# Patient Record
Sex: FEMALE | Race: WHITE | NOT HISPANIC OR LATINO | Employment: OTHER | URBAN - METROPOLITAN AREA
[De-identification: names, ages, dates, MRNs, and addresses within clinical notes are randomized per-mention and may not be internally consistent; named-entity substitution may affect disease eponyms.]

---

## 2021-08-16 ENCOUNTER — TREATMENT (OUTPATIENT)
Dept: PHYSICAL THERAPY | Facility: CLINIC | Age: 66
End: 2021-08-16

## 2021-08-16 DIAGNOSIS — M75.51 SUBACROMIAL BURSITIS OF RIGHT SHOULDER JOINT: Primary | ICD-10-CM

## 2021-08-16 DIAGNOSIS — R68.89 DECREASED STRENGTH, ENDURANCE, AND MOBILITY: ICD-10-CM

## 2021-08-16 DIAGNOSIS — M25.511 CHRONIC RIGHT SHOULDER PAIN: ICD-10-CM

## 2021-08-16 DIAGNOSIS — R29.898 WEAKNESS OF SHOULDER: ICD-10-CM

## 2021-08-16 DIAGNOSIS — R53.1 DECREASED STRENGTH, ENDURANCE, AND MOBILITY: ICD-10-CM

## 2021-08-16 DIAGNOSIS — Z74.09 DECREASED STRENGTH, ENDURANCE, AND MOBILITY: ICD-10-CM

## 2021-08-16 DIAGNOSIS — G89.29 CHRONIC RIGHT SHOULDER PAIN: ICD-10-CM

## 2021-08-16 PROCEDURE — 97161 PT EVAL LOW COMPLEX 20 MIN: CPT | Performed by: PHYSICAL THERAPIST

## 2021-08-16 PROCEDURE — 97140 MANUAL THERAPY 1/> REGIONS: CPT | Performed by: PHYSICAL THERAPIST

## 2021-08-16 PROCEDURE — 97110 THERAPEUTIC EXERCISES: CPT | Performed by: PHYSICAL THERAPIST

## 2021-08-16 NOTE — PROGRESS NOTES
Physical Therapy Initial Evaluation and Plan of Care    Patient: Mikaela Macias   : 1955  Diagnosis/ICD-10 Code:  Subacromial bursitis of right shoulder joint [M75.51]  Referring practitioner: Finn Araya MD  Date of Initial Visit: 2021  Today's Date: 2021  Patient seen for 1 sessions           Subjective Questionnaire:UEFI: 17% limitation      Subjective Evaluation    History of Present Illness  Date of onset: 2021  Mechanism of injury: Patient states she developed an insidious onset of right lateral shoulder pain.  She initially thought that she injured her shoulder during a workout over some weight training, bodyweight exercises.  Patient does not remember a specific injury during any workouts and after taking a rest from workouts did not notice any improvement.  AGG: donning and doffing a bra or shirt causes increase pain 9/10//ease quickly  Margaret for several hours increase pain 5/10, worsened to 9/10 later in evening//ease over more several days  Ease: avoidance, ignore it, Aleve occasionally helps  AM: depends on previous day 2-9/10  Through day: Subsides until aggravated again  PM: Worse  Night pain (+) waking after approx 1 hour but then takes up to 2 hours to get back to sleep.  Denies neck issues but occ has pain over upper trapezius on right.    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   Precautions and Work Restrictions: walk 4 days per week, weight trainig 3 x per week, gardening, mow grass, domestic engineering, farmingQuality of life: excellent    Pain  Current pain ratin  At best pain ratin  At worst pain rating: 10  Location: Feels bruised over the lateral upper arm  Relieving factors: medications and change in position  Progression: no change    Social Support  Lives in: multiple-level home  Lives with: spouse    Hand dominance:  right    Diagnostic Tests  X-ray: normal (read as normal, right shoulder)    Treatments  Current treatment: medication  Patient Goals  Patient goals for therapy: increased strength, return to sport/leisure activities, increased motion and independence with ADLs/IADLs  Patient goal: Able to reach behind back, dress and sleep without pain           Objective          Postural Observations  Seated posture: fair  Standing posture: fair    Additional Postural Observation Details  Rounded shoulders, mild forward head, upper thoracic kyphosis noted in sitting and standing with a flattened T-spine    Observations   Left Shoulder   Negative for atrophy, deformity, edema and incision.     Right Shoulder  Negative for atrophy, deformity and edema.       Palpation   Left   No palpable tenderness to the infraspinatus.   Tenderness of the infraspinatus.     Right Tenderness of the infraspinatus, supraspinatus and upper trapezius.     Tenderness     Left Shoulder   No tenderness No tenderness in the AC joint, acromion, biceps tendon (proximal), bicipital groove, clavicle, coracoid process, infraspinatus tendon, SC joint, subacromial bursa, subscapularis tendon and supraspinatus tendon.     Right Shoulder  Tenderness in the subacromial bursa and supraspinatus tendon. No tenderness in the AC joint, acromion, biceps tendon (proximal), bicipital groove, clavicle, coracoid process, infraspinatus tendon and SC joint.     Active Range of Motion   Cervical/Thoracic Spine   Cervical    Flexion: WFL  Extension: WFL  Left lateral flexion: 25 degrees with pain  Right lateral flexion: WFL  Left rotation: 75 degrees with pain  Right rotation: WFL    Thoracic   Flexion: WFL  Extension: Active thoracic extension: but stiff. WFL  Left Shoulder   Normal active range of motion  External rotation BTH: T3   Internal rotation BTB: L3     Right Shoulder   Flexion: 165 degrees   Extension: 40 degrees   Abduction: 130 degrees   External rotation 0°: 60  degrees   External rotation BTH: T3   Internal rotation BTB: L1     Passive Range of Motion   Left Shoulder   Normal passive range of motion    Right Shoulder   Abduction: with pain  External rotation 0°: WFL  External rotation 90°: WFL  Internal rotation 90°: WFL    Additional Passive Range of Motion Details  Stiffness noted into extension and right quadrant but produced upper trap pain not shoulder symptoms.    Scapular Mobility   Left Shoulder   Scapular mobility: WFL    Right Shoulder   Scapular mobility: good    Joint Play     Right Shoulder  Hypomobile in the posterior capsule and inferior capsule.     Strength/Myotome Testing     Left Shoulder   Normal muscle strength    Right Shoulder     Planes of Motion   Flexion: 4   Extension: 5   Right shoulder abduction strength: weak and painful.   Adduction: 5   External rotation at 0°: 4   Internal rotation at 0°: 4     Isolated Muscles   Biceps: 5   Lower trapezius: 4-   Middle trapezius: 4-   Serratus anterior: 4-   Triceps: 5   Upper trapezius: 5     Tests   Cervical     Left   Negative active compression ([place]).     Right   Negative active compression ([place]).     Left Shoulder   Negative AC shear, anterior slide, Hawkin's, Neer's, posterior load and shift and sulcus sign.     Right Shoulder   Positive AC shear, Hawkin's and Neer's.   Negative anterior slide, posterior load and shift and sulcus sign.           Assessment & Plan     Assessment  Impairments: abnormal muscle firing, abnormal muscle tone, abnormal or restricted ROM, activity intolerance, impaired physical strength, lacks appropriate home exercise program and pain with function  Assessment details: 66 y/o female with signs and symptoms consistent with sub acromial impingement. Patient has limitation ins shoulder ROM and strength, poor posture and pain with ADL's.  Patient will benefit from therapy to address the above mentioned impairments.    Barriers to therapy: none  Prognosis:  good  Prognosis details: Patient responded well to therapy to day and has a stable condition that is amendable to physical therapy  Functional Limitations: carrying objects, lifting, pulling, pushing, reaching behind back, reaching overhead and unable to perform repetitive tasks  Goals  Plan Goals: SHOULDER  PROBLEMS:  1. The patient has limited ROM of the right shoulder.    LTG 1: 6 weeks:  The patient will demonstrate painfree AROM 160 degrees of L shoulder flexion, 160 degrees of shoulder abduction, 70 degrees of shoulder external rotation, and 80 degrees of shoulder internal rotation to allow the patient to reach into upper kitchen cabinets and manipulate clothing behind the back with greater ease.    STATUS:  New   STG 1a: 3 weeks:  The patient will demonstrate painfree AROM 140 degrees of L shoulder flexion, 140 degrees of shoulder abduction, 60 degrees of shoulder external rotation, and 45 degrees of shoulder internal rotation.    STATUS:  New   TREATMENT: Manual therapy, therapeutic exercise, home exercise instruction, and modalities as needed to include: electrical stimulation, ultrasound, moist heat, and ice.    2. The patient has limited strength of the right shoulder.   LTG 2: 6 weeks:  The patient will demonstrate 5/5 strength for L shoulder flexion, abduction, external rotation, and internal rotation in order to demonstrate improved shoulder stability.    STATUS:  New   STG 2a: 4 weeks:  The patient will demonstrate 4+/5 strength for L shoulder flexion, abduction, external rotation, and internal rotation.    STATUS:  New   STG2b:  2 weeks:  The patient will be independent with home exercises.     STATUS:  New   TREATMENT: Manual therapy, therapeutic exercise, home exercise instruction, and modalities as needed to include: electrical stimulation, ultrasound, moist heat, and ice.     3. The patient complains of pain to the right shoulder.   LTG 3: 4 weeks:  The patient will report a pain rating of 2/10  or better in order to improve sleep quality and tolerance to performance of activities of daily living.    STATUS:  New   STG 3a: 2 weeks:  The patient will report a pain rating of 4/10 or better.     STATUS:  New   TREATMENT: Manual therapy, therapeutic exercise, home exercise instruction, and modalities as needed to include: electrical stimulation, ultrasound, moist heat, and ice.    4.. Carrying, Moving, and Handling Objects Functional Limitation     LTG 4: 4 weeks:  The patient will demonstrate 6% limitation by achieving a score of 5 on the LEFS.    STATUS:  New   STG 4a: 2 weeks:  The patient will demonstrate independence with comprehensive HEP.       STATUS:  New   TREATMENT:  Manual therapy, therapeutic exercise, home exercise instruction, and modalities as needed to include: moist heat, electrical stimulation, and ultrasound.    Plan  Therapy options: will be seen for skilled physical therapy services  Planned modality interventions: TENS  Planned therapy interventions: manual therapy, neuromuscular re-education, motor coordination training, postural training, soft tissue mobilization, spinal/joint mobilization, strengthening, stretching, therapeutic activities, joint mobilization, IADL retraining, home exercise program, gait training, functional ROM exercises and flexibility  Frequency: 2x week  Duration in weeks: 8  Plan details: Plan to focus on addressing her ROM deficits with stretching and manual therapy and progress to strengthening her R/C and periscapular musculature and address postural deficits.        Timed:         Manual Therapy:    8     mins  68436;     Therapeutic Exercise:    10     mins  33459;     Neuromuscular Tal:        mins  92982;    Therapeutic Activity:          mins  19625;     Gait Training:           mins  34285;     Ultrasound:          mins  89703;    Ionto                                   mins   95884  Self-care  ____ mins 30669    Un-Timed:  Electrical Stimulation:          mins  61922 ( );  Dry Needling          mins self-pay  Traction          mins 97283  Low Eval     42     Mins  97401  Mod Eval          Mins  65350  High Eval                            Mins  86096  Canal repositioning _____ mins  09195        Timed Treatment:   18   mins   Total Treatment:     60   mins    PT SIGNATURE: Marcello Eshaniki, PT   DATE TREATMENT INITIATED: 8/16/2021    Initial Certification  Certification Period: 11/14/2021  I certify that the therapy services are furnished while this patient is under my care.  The services outlined above are required by this patient, and will be reviewed every 90 days.     PHYSICIAN: Finn Araya MD      DATE:     Please sign and return via fax to [phone].. Thank you, [facility].

## 2021-08-20 ENCOUNTER — TREATMENT (OUTPATIENT)
Dept: PHYSICAL THERAPY | Facility: CLINIC | Age: 66
End: 2021-08-20

## 2021-08-20 DIAGNOSIS — M25.511 CHRONIC RIGHT SHOULDER PAIN: ICD-10-CM

## 2021-08-20 DIAGNOSIS — R53.1 DECREASED STRENGTH, ENDURANCE, AND MOBILITY: ICD-10-CM

## 2021-08-20 DIAGNOSIS — M75.51 SUBACROMIAL BURSITIS OF RIGHT SHOULDER JOINT: Primary | ICD-10-CM

## 2021-08-20 DIAGNOSIS — R68.89 DECREASED STRENGTH, ENDURANCE, AND MOBILITY: ICD-10-CM

## 2021-08-20 DIAGNOSIS — R29.898 WEAKNESS OF SHOULDER: ICD-10-CM

## 2021-08-20 DIAGNOSIS — G89.29 CHRONIC RIGHT SHOULDER PAIN: ICD-10-CM

## 2021-08-20 DIAGNOSIS — Z74.09 DECREASED STRENGTH, ENDURANCE, AND MOBILITY: ICD-10-CM

## 2021-08-20 PROCEDURE — 97140 MANUAL THERAPY 1/> REGIONS: CPT | Performed by: PHYSICAL THERAPIST

## 2021-08-20 PROCEDURE — 97110 THERAPEUTIC EXERCISES: CPT | Performed by: PHYSICAL THERAPIST

## 2021-08-20 NOTE — PROGRESS NOTES
Physical Therapy Daily Progress Note        Patient: Mikaela Macias   : 1955  Diagnosis/ICD-10 Code:  Subacromial bursitis of right shoulder joint [M75.51]  Referring practitioner: Finn Araya MD  Date of Initial Visit: Type: THERAPY  Noted: 2021  Today's Date: 2021  Patient seen for 2 sessions             Subjective   Mikaela Macias reports: shoulder is feeling 25 to 30% better, she is not through day and is sleeping better.  Patient had a little pain 2 days ago with vacuuming but is much better today.  HEP is going well    Objective   Patient was able to flex her shoulder through full range with mild End range pain, HBB increase pain 3-4/10 the resolved quickly.  Post treatment she had noted a reduction in her pain and had improvement in her ROM  See Exercise, Manual, and Modality Logs for complete treatment.       Assessment/Plan  Plan to progress her shoulder stab exercises and progress her to functional activities with overhead lifting  Progress per Plan of Care and Progress strengthening /stabilization /functional activity           Timed:  Manual Therapy:    10     mins  40531;  Therapeutic Exercise:    20     mins  77897;     Neuromuscular Tal:        mins  31437;    Therapeutic Activity:          mins  29623;     Gait Training:           mins  83783;     Ultrasound:          mins  95517;    Electrical Stimulation:         mins  27434;  Iontophoresis          mins  25178    Untimed:  Electrical Stimulation:         mins  34141 ( );  Mechanical Traction:         mins  85366;   Fluidotherapy          mins  43855    Timed Treatment:   30   mins   Total Treatment:     30   mins        Marcello Muñoz PT  Physical Therapist

## 2021-08-25 ENCOUNTER — TREATMENT (OUTPATIENT)
Dept: PHYSICAL THERAPY | Facility: CLINIC | Age: 66
End: 2021-08-25

## 2021-08-25 DIAGNOSIS — Z74.09 DECREASED STRENGTH, ENDURANCE, AND MOBILITY: ICD-10-CM

## 2021-08-25 DIAGNOSIS — M75.51 SUBACROMIAL BURSITIS OF RIGHT SHOULDER JOINT: Primary | ICD-10-CM

## 2021-08-25 DIAGNOSIS — M25.511 CHRONIC RIGHT SHOULDER PAIN: ICD-10-CM

## 2021-08-25 DIAGNOSIS — G89.29 CHRONIC RIGHT SHOULDER PAIN: ICD-10-CM

## 2021-08-25 DIAGNOSIS — R53.1 DECREASED STRENGTH, ENDURANCE, AND MOBILITY: ICD-10-CM

## 2021-08-25 DIAGNOSIS — R68.89 DECREASED STRENGTH, ENDURANCE, AND MOBILITY: ICD-10-CM

## 2021-08-25 DIAGNOSIS — R29.898 WEAKNESS OF SHOULDER: ICD-10-CM

## 2021-08-25 PROCEDURE — 97110 THERAPEUTIC EXERCISES: CPT | Performed by: PHYSICAL THERAPIST

## 2021-08-25 PROCEDURE — 97140 MANUAL THERAPY 1/> REGIONS: CPT | Performed by: PHYSICAL THERAPIST

## 2021-08-25 NOTE — PROGRESS NOTES
Physical Therapy Daily Progress Note      Patient: Mikaela Macias   : 1955  Diagnosis/ICD-10 Code:  Subacromial bursitis of right shoulder joint [M75.51]  Referring practitioner: Finn Araya MD  Date of Initial Visit: Type: THERAPY  Noted: 2021  Today's Date: 2021  Patient seen for 3 sessions           Subjective   Reports she was picking apples and her shoulder flared up the next day.  Today, has 3/10 pain.   Objective   See Exercise, Manual, and Modality Logs for complete treatment.   Comparable sign: AP GHJ glide with shoulder extension  After manual techniques: abolished comparable signs      Assessment/Plan  Responded well to A/P  Glides.     PLAN: Progress per Plan of Care             Manual Therapy:    15     mins  24208;  Therapeutic Exercise:    15     mins  71924;     Neuromuscular Tal:        mins  45922;    Therapeutic Activity:          mins  90139;     Gait Training:           mins  20111;     Ultrasound:          mins  07793;    Electrical Stimulation:         mins  97171 ( );    Timed Treatment:   30   mins   Total Treatment:     30   mins    Paula Muñoz PT  Physical Therapist

## 2021-09-02 ENCOUNTER — TREATMENT (OUTPATIENT)
Dept: PHYSICAL THERAPY | Facility: CLINIC | Age: 66
End: 2021-09-02

## 2021-09-02 DIAGNOSIS — R68.89 DECREASED STRENGTH, ENDURANCE, AND MOBILITY: ICD-10-CM

## 2021-09-02 DIAGNOSIS — Z74.09 DECREASED STRENGTH, ENDURANCE, AND MOBILITY: ICD-10-CM

## 2021-09-02 DIAGNOSIS — R29.898 WEAKNESS OF SHOULDER: ICD-10-CM

## 2021-09-02 DIAGNOSIS — M75.51 SUBACROMIAL BURSITIS OF RIGHT SHOULDER JOINT: Primary | ICD-10-CM

## 2021-09-02 DIAGNOSIS — M25.511 CHRONIC RIGHT SHOULDER PAIN: ICD-10-CM

## 2021-09-02 DIAGNOSIS — G89.29 CHRONIC RIGHT SHOULDER PAIN: ICD-10-CM

## 2021-09-02 DIAGNOSIS — R53.1 DECREASED STRENGTH, ENDURANCE, AND MOBILITY: ICD-10-CM

## 2021-09-02 PROCEDURE — 97140 MANUAL THERAPY 1/> REGIONS: CPT | Performed by: PHYSICAL THERAPIST

## 2021-09-02 PROCEDURE — 97110 THERAPEUTIC EXERCISES: CPT | Performed by: PHYSICAL THERAPIST

## 2021-09-08 ENCOUNTER — TREATMENT (OUTPATIENT)
Dept: PHYSICAL THERAPY | Facility: CLINIC | Age: 66
End: 2021-09-08

## 2021-09-08 DIAGNOSIS — G89.29 CHRONIC RIGHT SHOULDER PAIN: ICD-10-CM

## 2021-09-08 DIAGNOSIS — R29.898 WEAKNESS OF SHOULDER: ICD-10-CM

## 2021-09-08 DIAGNOSIS — M75.51 SUBACROMIAL BURSITIS OF RIGHT SHOULDER JOINT: Primary | ICD-10-CM

## 2021-09-08 DIAGNOSIS — R68.89 DECREASED STRENGTH, ENDURANCE, AND MOBILITY: ICD-10-CM

## 2021-09-08 DIAGNOSIS — Z74.09 DECREASED STRENGTH, ENDURANCE, AND MOBILITY: ICD-10-CM

## 2021-09-08 DIAGNOSIS — M25.511 CHRONIC RIGHT SHOULDER PAIN: ICD-10-CM

## 2021-09-08 DIAGNOSIS — R53.1 DECREASED STRENGTH, ENDURANCE, AND MOBILITY: ICD-10-CM

## 2021-09-08 PROCEDURE — 97140 MANUAL THERAPY 1/> REGIONS: CPT | Performed by: PHYSICAL THERAPIST

## 2021-09-08 PROCEDURE — 97110 THERAPEUTIC EXERCISES: CPT | Performed by: PHYSICAL THERAPIST

## 2021-09-08 NOTE — PROGRESS NOTES
"   Physical Therapy Daily Progress Note      Patient: Mikaela Macias   : 1955  Diagnosis/ICD-10 Code:  Subacromial bursitis of right shoulder joint [M75.51]  Referring practitioner: Finn Araya MD  Date of Initial Visit: Type: THERAPY  Noted: 2021  Today's Date: 2021  Patient seen for 5 sessions           Subjective Reports she is feeling better and has 1-3/10 pain in R shoulder with IR but on consistently.  States it \"catches\" her when she is doing active motion such as gardening.  Pt states it's when she is abducting with IR that she will get a pain in her shoulder.     Objective   See Exercise, Manual, and Modality Logs for complete treatment.   (+) clowards - medial border R scapula C6, 7  -Cervical Comparable signs * - R cervical rotation  And cervical extension  Each Produce medial border scap pain  Cervical/thoracic joint mobilizations - MWM, and thoracic ext mob grade 5 abolish comparable signs.     -Comparable sign in R shoulder: IR with slight abduction  Manual techniques in L SL, P/A to glenohumeral head, caudal GHJ, IR with anterior glide  Decreased comparable sign from 3/10 to < 1/10  Assessment/Plan Pt tolerating treatment well.       PLAN: Progress per Plan of Care             Manual Therapy:    20     mins  87960;  Therapeutic Exercise:    12     mins  99334;     Neuromuscular Tal:        mins  69271;    Therapeutic Activity:          mins  12075;     Gait Training:           mins  97691;     Ultrasound:          mins  53311;    Electrical Stimulation:         mins  90097 ( );    Timed Treatment:   32   mins   Total Treatment:     32   mins    Paula Muñoz PT  Physical Therapist                    "

## 2021-09-10 ENCOUNTER — TREATMENT (OUTPATIENT)
Dept: PHYSICAL THERAPY | Facility: CLINIC | Age: 66
End: 2021-09-10

## 2021-09-10 DIAGNOSIS — R53.1 DECREASED STRENGTH, ENDURANCE, AND MOBILITY: ICD-10-CM

## 2021-09-10 DIAGNOSIS — M25.511 CHRONIC RIGHT SHOULDER PAIN: ICD-10-CM

## 2021-09-10 DIAGNOSIS — R68.89 DECREASED STRENGTH, ENDURANCE, AND MOBILITY: ICD-10-CM

## 2021-09-10 DIAGNOSIS — Z74.09 DECREASED STRENGTH, ENDURANCE, AND MOBILITY: ICD-10-CM

## 2021-09-10 DIAGNOSIS — R29.898 WEAKNESS OF SHOULDER: ICD-10-CM

## 2021-09-10 DIAGNOSIS — G89.29 CHRONIC RIGHT SHOULDER PAIN: ICD-10-CM

## 2021-09-10 DIAGNOSIS — M75.51 SUBACROMIAL BURSITIS OF RIGHT SHOULDER JOINT: Primary | ICD-10-CM

## 2021-09-10 PROCEDURE — 97110 THERAPEUTIC EXERCISES: CPT | Performed by: PHYSICAL THERAPIST

## 2021-09-10 PROCEDURE — 97140 MANUAL THERAPY 1/> REGIONS: CPT | Performed by: PHYSICAL THERAPIST

## 2021-09-10 NOTE — PROGRESS NOTES
Physical Therapy Daily Progress Note        Patient: Mikaela Macias   : 1955  Diagnosis/ICD-10 Code:  Subacromial bursitis of right shoulder joint [M75.51]  Referring practitioner: Finn Araya MD  Date of Initial Visit: Type: THERAPY  Noted: 2021  Today's Date: 9/10/2021  Patient seen for 6 sessions             Subjective   Mikaela Macias reports: her shoulder is feeling much better after last treatment session.  She stated adding the thoracic mobilization felt sore but now shoulder is much better, having now only pain with HBB.    Objective   HBBL Left T6, R T9 increase pain 2/10//ease OOP  See Exercise, Manual, and Modality Logs for complete treatment.       Assessment/Plan  Patient progressing well  Progress per Plan of Care           Timed:  Manual Therapy:    14     mins  03373;  Therapeutic Exercise:    16     mins  40236;     Neuromuscular Tal:        mins  85144;    Therapeutic Activity:          mins  08575;     Gait Training:           mins  34401;     Ultrasound:          mins  69696;    Electrical Stimulation:         mins  42327;  Iontophoresis          mins  50794    Untimed:  Electrical Stimulation:         mins  53982 ( );  Mechanical Traction:         mins  22135;   Fluidotherapy          mins  55100    Timed Treatment:   30   mins   Total Treatment:     30   mins        Marcello Muñoz PT  Physical Therapist

## 2021-09-13 ENCOUNTER — TREATMENT (OUTPATIENT)
Dept: PHYSICAL THERAPY | Facility: CLINIC | Age: 66
End: 2021-09-13

## 2021-09-13 DIAGNOSIS — M75.51 SUBACROMIAL BURSITIS OF RIGHT SHOULDER JOINT: Primary | ICD-10-CM

## 2021-09-13 DIAGNOSIS — R29.898 WEAKNESS OF SHOULDER: ICD-10-CM

## 2021-09-13 DIAGNOSIS — M25.511 CHRONIC RIGHT SHOULDER PAIN: ICD-10-CM

## 2021-09-13 DIAGNOSIS — R68.89 DECREASED STRENGTH, ENDURANCE, AND MOBILITY: ICD-10-CM

## 2021-09-13 DIAGNOSIS — R53.1 DECREASED STRENGTH, ENDURANCE, AND MOBILITY: ICD-10-CM

## 2021-09-13 DIAGNOSIS — G89.29 CHRONIC RIGHT SHOULDER PAIN: ICD-10-CM

## 2021-09-13 DIAGNOSIS — Z74.09 DECREASED STRENGTH, ENDURANCE, AND MOBILITY: ICD-10-CM

## 2021-09-13 PROCEDURE — 97112 NEUROMUSCULAR REEDUCATION: CPT | Performed by: PHYSICAL THERAPIST

## 2021-09-13 PROCEDURE — 97110 THERAPEUTIC EXERCISES: CPT | Performed by: PHYSICAL THERAPIST

## 2021-09-13 NOTE — PROGRESS NOTES
Physical Therapy Daily Progress Note        Patient: Mikaela Macias   : 1955  Diagnosis/ICD-10 Code:  Subacromial bursitis of right shoulder joint [M75.51]  Referring practitioner: Finn Araya MD  Date of Initial Visit: Type: THERAPY  Noted: 2021  Today's Date: 2021  Patient seen for 7 sessions             Subjective   Mikaela Macias reports: her shoulder is feeling very good. Denies pain over the weekend and has been able to actively use shoulder without pain to include painting and throwing.  Feels 95% of normal    Objective   Shoulder ROM is WFL and now only having pain with HBB on the right  See Exercise, Manual, and Modality Logs for complete treatment.       Assessment/Plan  Patient is progressing very well and is nearing the time for discharge  Progress strengthening /stabilization /functional activity and Anticipate DC next Visit           Timed:  Manual Therapy:         mins  29332;  Therapeutic Exercise:    15     mins  06002;     Neuromuscular Tal:    15    mins  08297;    Therapeutic Activity:          mins  95257;     Gait Training:           mins  97511;     Ultrasound:          mins  01351;    Electrical Stimulation:         mins  80678;  Iontophoresis          mins  67871    Untimed:  Electrical Stimulation:         mins  73839 ( );  Mechanical Traction:         mins  79712;   Fluidotherapy          mins  08574    Timed Treatment:   30   mins   Total Treatment:     30   mins        Marcello Muñoz PT  Physical Therapist

## 2021-09-22 ENCOUNTER — TREATMENT (OUTPATIENT)
Dept: PHYSICAL THERAPY | Facility: CLINIC | Age: 66
End: 2021-09-22

## 2021-09-22 DIAGNOSIS — M25.511 CHRONIC RIGHT SHOULDER PAIN: ICD-10-CM

## 2021-09-22 DIAGNOSIS — R53.1 DECREASED STRENGTH, ENDURANCE, AND MOBILITY: ICD-10-CM

## 2021-09-22 DIAGNOSIS — M75.51 SUBACROMIAL BURSITIS OF RIGHT SHOULDER JOINT: Primary | ICD-10-CM

## 2021-09-22 DIAGNOSIS — R29.898 WEAKNESS OF SHOULDER: ICD-10-CM

## 2021-09-22 DIAGNOSIS — Z74.09 DECREASED STRENGTH, ENDURANCE, AND MOBILITY: ICD-10-CM

## 2021-09-22 DIAGNOSIS — G89.29 CHRONIC RIGHT SHOULDER PAIN: ICD-10-CM

## 2021-09-22 DIAGNOSIS — R68.89 DECREASED STRENGTH, ENDURANCE, AND MOBILITY: ICD-10-CM

## 2021-09-22 PROCEDURE — 97164 PT RE-EVAL EST PLAN CARE: CPT | Performed by: PHYSICAL THERAPIST

## 2021-09-22 NOTE — PROGRESS NOTES
"Re-Assessment / Re-Certification      Patient: Mikaela Macias   : 1955  Diagnosis/ICD-10 Code:  Subacromial bursitis of right shoulder joint [M75.51]  Referring practitioner: Finn Araya MD  Date of Initial Visit: Type: THERAPY  Noted: 2021  Today's Date: 2021  Patient seen for 8 sessions      Subjective:   Mikaela Macias reports: Pt reports she feels 100% better.  She is able to do all activities she was able to do prior to shoulder problems.  She is very compliant with Hep daily.  Reports no pain with the exception of occassional \"pop\" in the shoulder that lasts only a few seconds and reaching a max of 1/10 described as an 'awareness' but not pain.    Subjective Questionnaire: UEFS: 80/80  Clinical Progress: improved  Home Program Compliance: Yes  Treatment has included: therapeutic exercise, neuromuscular re-education, manual therapy, therapeutic activity, ultrasound, moist heat and cryotherapy    Subjective   Objective   Assessment/Plan  Progress toward previous goals: All Met    New Goals  Discharge patient at this time.  She has met all goals. ROM is full and painfree.  Strength is WNL's. Pt is independent and compliant with HEP.   Recommendations: Discharge    Prognosis to achieve goals: good    PT Signature: Paula Muñoz PT      Based upon review of the patient's progress and continued therapy plan, it is my medical opinion that Mikaela Macias should continue physical therapy treatment at [facility]  [address] [place] [address] [address] [place] [address] [phone].    Signature: __________________________________  Finn Araya MD    Manual Therapy:         mins  21518;  Therapeutic Exercise:         mins  55635;     Neuromuscular Tal:        mins  12038;    Therapeutic Activity:          mins  24994;     Gait Training:           mins  65190;     Ultrasound:          mins  23801;    Electrical Stimulation:         mins  46333 " ( );      Timed Treatment:      mins   Total Treatment:     15   mins